# Patient Record
Sex: FEMALE | Race: BLACK OR AFRICAN AMERICAN
[De-identification: names, ages, dates, MRNs, and addresses within clinical notes are randomized per-mention and may not be internally consistent; named-entity substitution may affect disease eponyms.]

---

## 2019-12-12 ENCOUNTER — HOSPITAL ENCOUNTER (OUTPATIENT)
Dept: HOSPITAL 61 - PCVCIMAG | Age: 44
Discharge: HOME | End: 2019-12-12
Attending: INTERNAL MEDICINE
Payer: COMMERCIAL

## 2019-12-12 DIAGNOSIS — Z80.9: ICD-10-CM

## 2019-12-12 DIAGNOSIS — I10: ICD-10-CM

## 2019-12-12 DIAGNOSIS — Z83.3: ICD-10-CM

## 2019-12-12 DIAGNOSIS — I08.1: Primary | ICD-10-CM

## 2019-12-12 DIAGNOSIS — E89.0: ICD-10-CM

## 2019-12-12 DIAGNOSIS — J45.909: ICD-10-CM

## 2019-12-12 DIAGNOSIS — E78.5: ICD-10-CM

## 2019-12-12 DIAGNOSIS — Z90.09: ICD-10-CM

## 2019-12-12 PROCEDURE — 93017 CV STRESS TEST TRACING ONLY: CPT

## 2019-12-12 PROCEDURE — 93306 TTE W/DOPPLER COMPLETE: CPT

## 2019-12-12 NOTE — PCVCIMAG
--------------- APPROVED REPORT --------------





Study performed:  2019 15:43:26



EXAM: Comprehensive 2D, Doppler, and color-flow 

Echocardiogram

Patient Location: Echo lab

Room #:  2Status:  routine



BSA:         1.72

HR: 77 bpmBP:          144/100 mmHg

Rhythm: NSR



Other Information 

Study Quality: Good



Risk Factors: 

Cardiac Risk Factors:  HTN, Hyperlipidemia



Indications

Murmur



2D Dimensions

IVSd:  9.80 (7-11mm)LVOT Diam:  18.67 (18-24mm) 

LVDd:  43.91 mm

PWd:  9.22 (7-11mm)Ascending Ao:  31.93 (22-36mm)

LVDs:  29.64 (25-40mm)

Left Atrium:  27.58 (27-40mm)

Aortic Root:  25.05 mm

LV Single Plane 4CH:  51.76 %

LV Single Plane 2CH:  59.15 %

Biplane EF:  56.7 %



Volumes

Left Atrial Volume (Systole)

Single Plane 4CH:  28.46 mLSingle Plane 2CH:  32.10 mL

Biplane LA Volume:  30.00 mLLA ESV Index:  18.00 mL/m2



Aortic Valve

AoV Peak Luis E.:  1.38 m/s

AO Peak Gr.:  7.59 mmHgLVOT Max P.08 mmHg

LVOT Max V:  0.72 m/s

FRANKIE Vmax: 1.43 cm2



Mitral Valve

E/A Ratio:  1.4

MV Decel. Time:  77.12 ms

MV E Max Luis E.:  0.80 m/s

MV A Luis E.:  0.57 m/s

IVRT:  89.97 ms



TDI

E/Lateral E':  7.27E/Medial E':  10.00

Medial E' Luis E.:  0.08 m/s

Lateral E' Luis E.:  0.11 m/s



Pulmonary Valve

PV Peak Luis E.:  0.71 m/sPV Peak Gr.:  1.99 mmHg



Pulmonary Vein

P Vein S:    0.34 m/sP Vein A:  0.31 m/s

P Vein D:   0.40 m/sP Vein A Dur.:  72.7 msec

P Vein S/D Ratio:  0.85



Tricuspid Valve

TR Peak Luis E.:  1.80 m/s

TR Peak Gr.:  12.89 mmHg

TV Vmax:  0.75 m/sPA Pressure:  23.00 mmHg



Left Ventricle

The left ventricle is normal size. There is normal LV segmental wall 

motion. There is normal left ventricular wall thickness. Left 

ventricular systolic function is normal. The left ventricular 

ejection fraction is within the normal range. LVEF is 55-60%. The 

left ventricular diastolic function is normal.



Right Ventricle

The right ventricle is normal size. The right ventricular systolic 

function is normal.



Atria

The left atrium size is normal. The right atrium size is 

normal.



Aortic Valve

Aortic valve is trileaflet. The aortic valve is normal in structure 

and function. No aortic regurgitation is present. There is no aortic 

valvular stenosis.



Mitral Valve

The mitral valve is normal in structure and function. Trace to mild 

mitral regurgitation. No evidence of mitral valve 

stenosis.



Tricuspid Valve

The tricuspid valve is normal in structure. Mild to moderate 

tricuspid regurgitation with a PA pressure of 23 mmHg.



Pulmonic Valve

The pulmonary valve is normal in structure. There is no pulmonic 

valvular regurgitation.



Great Vessels

The aortic root is normal in size. The ascending aorta is normal in 

size. Aortic arch is normal in caliber. IVC is dilated and collapses 

>50% with inspiration.



Pericardium

There is no pericardial effusion. There is no pleural 

effusion.



<Conclusion>

Left ventricular systolic function is normal.

There is normal LV segmental wall motion.

LVEF is 55-60%.

The aortic valve is normal in structure and function.

The mitral valve is normal in structure and function.

Trace to mild mitral regurgitation.

Mild tricuspid regurgitation with a pulmonary artery pressure of 23 

mmHg.

There is no pericardial effusion.

## 2019-12-12 NOTE — PCVCIMAG
--------------- APPROVED REPORT --------------





Patient Location: Echo lab- TREADMILL STRESS TEST

Room #: 2



Stress Nurse: Quiana Rae RN



iNDICATIONS: Hypertension,Murmur,Dyslipidemia



The patient exercised according to the GHADA protocol for 10:50 mins; 

achieving a work level of 13.4 METS. 

The resting heart rate of 85 bpm jennifer to a maximum heart rate of 181 

bpm. 

This value represent 103% of the maximal, age-predicted heart rate. 

The resting blood pressure of 144/100  mmHg, jennifer to a maximum blood 

pressure of 180/100 mmHg. 

The exercise test was stopped due to fatigue and dyspnea .



Resting EKG: Sinus rhythm normal tracing

Stress electrocardiogram: No dysrhythmias. No diagnostic ischemic 

electrocardiographic changes. Hypertensive response to exercise.



 Conclusion

1. Normal treadmill exercise study negative for exercise-induced 

myocardial ischemia.

2. No subjective signs of ischemia. No ischemic electrocardiographic 

changes. Hypertensive response to exercise

3. The study was associated with good exercise capacity (13.4METS). 

Low [facility] treadmill exercise score.



With her hypertension and hypertensive response to exercise, have 

started amlodipine 5 mg daily, prescription given.